# Patient Record
Sex: FEMALE | Race: WHITE
[De-identification: names, ages, dates, MRNs, and addresses within clinical notes are randomized per-mention and may not be internally consistent; named-entity substitution may affect disease eponyms.]

---

## 2018-04-05 ENCOUNTER — HOSPITAL ENCOUNTER (INPATIENT)
Dept: HOSPITAL 11 - JP.SDS | Age: 33
LOS: 2 days | Discharge: HOME | DRG: 403 | End: 2018-04-07
Attending: SURGERY | Admitting: SURGERY
Payer: COMMERCIAL

## 2018-04-05 DIAGNOSIS — K76.0: ICD-10-CM

## 2018-04-05 DIAGNOSIS — G47.33: ICD-10-CM

## 2018-04-05 DIAGNOSIS — K44.9: ICD-10-CM

## 2018-04-05 DIAGNOSIS — E66.01: Primary | ICD-10-CM

## 2018-04-05 DIAGNOSIS — R16.0: ICD-10-CM

## 2018-04-05 DIAGNOSIS — D17.4: ICD-10-CM

## 2018-04-05 DIAGNOSIS — Z99.89: ICD-10-CM

## 2018-04-05 PROCEDURE — 0D164ZA BYPASS STOMACH TO JEJUNUM, PERCUTANEOUS ENDOSCOPIC APPROACH: ICD-10-PCS | Performed by: SURGERY

## 2018-04-05 PROCEDURE — C9113 INJ PANTOPRAZOLE SODIUM, VIA: HCPCS

## 2018-04-05 PROCEDURE — 0WBC4ZX EXCISION OF MEDIASTINUM, PERCUTANEOUS ENDOSCOPIC APPROACH, DIAGNOSTIC: ICD-10-PCS | Performed by: SURGERY

## 2018-04-05 PROCEDURE — 3E0T3BZ INTRODUCTION OF ANESTHETIC AGENT INTO PERIPHERAL NERVES AND PLEXI, PERCUTANEOUS APPROACH: ICD-10-PCS | Performed by: SURGERY

## 2018-04-05 PROCEDURE — 0BQT4ZZ REPAIR DIAPHRAGM, PERCUTANEOUS ENDOSCOPIC APPROACH: ICD-10-PCS | Performed by: SURGERY

## 2018-04-05 PROCEDURE — 0FB24ZX EXCISION OF LEFT LOBE LIVER, PERCUTANEOUS ENDOSCOPIC APPROACH, DIAGNOSTIC: ICD-10-PCS | Performed by: SURGERY

## 2018-04-05 RX ADMIN — ACETAMINOPHEN SCH MG: 650 SOLUTION ORAL at 19:45

## 2018-04-05 RX ADMIN — GABAPENTIN SCH MG: 250 SOLUTION ORAL at 14:02

## 2018-04-05 RX ADMIN — GABAPENTIN SCH MG: 250 SOLUTION ORAL at 21:54

## 2018-04-05 RX ADMIN — ACETAMINOPHEN SCH MG: 650 SOLUTION ORAL at 13:47

## 2018-04-05 RX ADMIN — LIDOCAINE HYDROCHLORIDE ANHYDROUS AND DEXTROSE MONOHYDRATE SCH MLS/HR: .4; 5 INJECTION, SOLUTION INTRAVENOUS at 12:15

## 2018-04-05 RX ADMIN — SODIUM CHLORIDE SCH UNITS: 0.9 INJECTION, SOLUTION INTRAVENOUS at 17:23

## 2018-04-06 RX ADMIN — SODIUM CHLORIDE SCH UNITS: 0.9 INJECTION, SOLUTION INTRAVENOUS at 05:27

## 2018-04-06 RX ADMIN — ACETAMINOPHEN SCH MG: 650 SOLUTION ORAL at 01:27

## 2018-04-06 RX ADMIN — GABAPENTIN SCH MG: 250 SOLUTION ORAL at 09:48

## 2018-04-06 RX ADMIN — ACETAMINOPHEN SCH MG: 650 SOLUTION ORAL at 19:49

## 2018-04-06 RX ADMIN — ACETAMINOPHEN SCH MG: 650 SOLUTION ORAL at 07:43

## 2018-04-06 RX ADMIN — GABAPENTIN SCH MG: 250 SOLUTION ORAL at 13:49

## 2018-04-06 RX ADMIN — ACETAMINOPHEN SCH MG: 650 SOLUTION ORAL at 13:49

## 2018-04-06 RX ADMIN — SODIUM CHLORIDE SCH UNITS: 0.9 INJECTION, SOLUTION INTRAVENOUS at 19:00

## 2018-04-06 RX ADMIN — LIDOCAINE HYDROCHLORIDE ANHYDROUS AND DEXTROSE MONOHYDRATE SCH MLS/HR: .4; 5 INJECTION, SOLUTION INTRAVENOUS at 01:25

## 2018-04-06 RX ADMIN — GABAPENTIN SCH MG: 250 SOLUTION ORAL at 20:43

## 2018-04-06 NOTE — PN
DATE OF SERVICE:  04/06/2018

 

SUBJECTIVE:  Agueda is postop day 1 following a Jose Maria-en-Y gastric bypass surgery.  Her upper

GI this morning was normal.  Vital signs have been stable.  Her pain is controlled and she

has been ambulating.

 

REVIEW OF SYSTEMS:  Remainder of review of systems negative for any pertinent positives and

negatives.

 

OBJECTIVE:  GENERAL:  Agueda Velasquez is a 33-year-old female.  She is alert and orientated,

sitting up in the chair.

VITAL SIGNS:  TPR 97.9, 79, 17, blood pressure 154/90.

HEENT:  Negative.

NECK:  Supple.

HEART:  Regular rate and rhythm.

LUNGS:  Clear.

ABDOMEN:  Dressings dry and intact.  BRYCE drain is intact and has put out 100 mL of a light

pink serosanguineous drainage.

EXTREMITIES:  Without peripheral edema.

 

ASSESSMENT:  Laparoscopic Jose Maria-en-Y gastric bypass surgery, repair of paraesophageal

diaphragmatic hernia, and Tigre-Cut needle liver biopsy.

 

PLAN:

1. Decrease IV to 100 mL per hour.

2. Step-2 gastric bypass diet without cereal.

3. Dressing off, may shower.

4. Communication order 3 med cups, one every 20 minutes, 3 per hour recorded at bedside.

5. Good pulmonary toilet.

6. Zofran ODT 4 mg q.4 hours p.r.n. nausea sublingual.

7. We will evaluate p.r.n. or in a.m.

 

 

 

 

Ana Hurley PA-C

DD:  04/06/2018 17:43:58

DT:  04/06/2018 20:42:31

Job #:  [id number]

## 2018-04-07 RX ADMIN — ACETAMINOPHEN SCH MG: 650 SOLUTION ORAL at 08:00

## 2018-04-07 RX ADMIN — ACETAMINOPHEN SCH MG: 650 SOLUTION ORAL at 01:58

## 2018-04-07 RX ADMIN — SODIUM CHLORIDE SCH UNITS: 0.9 INJECTION, SOLUTION INTRAVENOUS at 05:10

## 2018-04-07 RX ADMIN — GABAPENTIN SCH MG: 250 SOLUTION ORAL at 08:00

## 2018-04-09 NOTE — DISCH
FINAL DIAGNOSES:

1. Morbid obesity.

2. Hepatomegaly.

3. Paraesophageal diaphragmatic hernia.

4. Mediastinal lipoma.

5. Obstructive sleep apnea.

 

PROCEDURES:  Operative procedures done on 04/05/2018, Jose Maria-en-Y gastric bypass with long

limb gastroenterostomy, Tigre-Cut needle liver biopsy, repair of paraesophageal diaphragmatic

hernia, and excision of mediastinal lipoma.

 

HOSPITAL COURSE:  This is a 33-year-old presenting with a longstanding history of morbid

obesity and increasingly significant comorbidities.  After preoperative evaluation and

discussion, she wished to proceed with a gastric bypass procedure.  This was done on the day

of admission, with the above-mentioned additional procedures.

 

Postoperatively, she has had no major problems.  She will be discharged to home with a

combination of Tylenol and gabapentin for her pain, as Celebrex is being worked on in terms

of possibly getting that covered by her insurance.  She will be taking the gabapentin and,

if available, Celebrex x2 weeks, and otherwise, she will follow up with Ana Hurley PA-C,

at [facility] in [place] on 04/17/2018.

## 2018-04-11 NOTE — OR
DATE OF PROCEDURE:  04/05/2018

 

PREOPERATIVE DIAGNOSIS:  Morbid obesity.

 

POSTOPERATIVE DIAGNOSES:

1. Morbid obesity.

2. Marked hepatomegaly.

3. Paraesophageal diaphragmatic hernia.

4. Mediastinal lipoma.

 

OPERATIVE PROCEDURES:

1. Laparoscopic Jose Maria-en-Y gastric bypass with long limb gastroenterostomy (74601
).

2. Tigre-Cut needle liver biopsy (22044).

3. Repair of paraesophageal diaphragmatic hernia (59361).

4. Excision of mediastinal lipoma (48925).

 

ANESTHESIA:  General.

 

ASSISTANTS:  Ana Hurley PA-C, and DINA Gordon.

 

INDICATIONS FOR PROCEDURE:  This is a 33-year-old female presenting with 
longstanding morbid

obesity with increasingly significant comorbidities.  After preoperative 
evaluation and

discussion, she wished to proceed with a gastric bypass procedure.  Potential 
risks of the

procedure including bleeding, infection, leaks from various GI tract closures, 
problems with

bowel obstruction over time, as well as possibility of cardiopulmonary, septic, 
or

hemorrhagic complications leading to death were discussed, and the patient 
wishes to

proceed.

 

DETAILS OF PROCEDURE:  The patient was taken to the operating room, and after 
general

endotracheal anesthesia was induced, was placed in a lithotomy position.  An 
orogastric tube

was placed and the abdomen was prepped and draped.

 

At 15 cm inferior, 5 cm left of xiphoid process, a transverse incision was made 
and

peritoneal cavity entered under direct vision with an Optiview trocar, inflated 
to 15 mmHg

pressure with CO2.  Laparoscope was then reinserted.  No underlying trocar 
insertion site

injuries were seen.  Following this, bilateral subcostal transverse abdominis 
plane blocks

were placed with direct visualization of the needle in the area of the 
transverse abdominis

plane and injection of standard solution.  Following this, 5 additional trocars 
were placed

across the upper and mid abdomen.  General exploration was undertaken.  The 
patient was

noted to have a marked hepatomegaly with liver volume being roughly 3 to 4 
times normal and

strikingly fatty infiltrated.  No signs of cirrhosis per se.  Tigre-Cut needle 
biopsies were

obtained from the left lobe of the liver.  Minimal bleeding from the biopsy 
sites was

controlled with electrocautery.

 

The omentum was then divided in the midline up to the level of the transverse 
colon.  This

allowed identification of the small bowel to the ligament of Treitz.  The small 
bowel was

then traced out 200 cm distal to that point, where it was divided transversely 
with a FIFI

stapler.  The small bowel was then traced out an additional 150 cm, where the 
side-to-side

enteroenterostomy was accomplished with internal firing of the Endo-FIFI 60 mm 
stapler.  The

common opening was then closed transversely with the same stapler and angles 
anastomosed and

mesenteric defect approximated with some 0 Ethibond stitch, along with fibrin 
sealant.  The

divided end of the Jose Maria limb was then  from the mesentery for a few 
centimeters,

which allowed an antecolic position of the Jose Maria limb up to the level of the 
gastroesophageal

junction without tension.

 

The liver was then retracted anteriorly.  The patient was noted to have a 
moderate-sized

paraesophageal diaphragmatic hernia with prolapse of a portion of the gastric 
fundus,

perigastric fat, and edge of the omentum in a plane anterior to the course of 
the esophagus.

This was reduced.  The peritoneum overlying it was incised and reflected 
downward.  During

the course of the dissection, the mediastinal lipoma was encountered, and this 
was excised

and sent as a separate specimen.  Anterior repair of the diaphragmatic hernia 
was then

accomplished with 0 Ethibond sutures reinforced with PTFE pledgets.

 

Following this, the gastrointestinal balloon catheter was inflated to 15 mL and 
pulled up

snugly against the EG junction.  Gastric wall over the apex of the balloon was 
then marked

with electrocautery and the balloon catheter pulled up into the esophagus.  The 
lesser

omental tissue adjacent to the gastric cardia was then incised, allowing 
dissection behind

the stomach at that level.  Gastric pouch was then initiated with a transverse 
firing of the

FIFI stapler at the level of the cauterized armani on the gastric cardia and then 
completed

with an additional firing of FIFI stapler up to and through the angle of His.  
Upon

completion of the pouch, both staple lines were noted to be intact.

 

The anvil of a 25-mm EEA stapler was attached to [place] sump-type tube.  The 
latter was

brought down through the mouth and taken out through a small opening in the 
gastric pouch,

allowing the anvil likewise to be pulled down to within the gastric pouch.  The 
divided end

of the Jose Maria limb was then opened and main body of the EEA stapler passed 
several centimeters

into the lumen of the small bowel, brought up the anvil, and united with it, 
thus creating

the gastrojejunostomy.  Upon removal of the stapler, double donuts of mucosa 
were noted

within it, and the small bowel was closed off with a vascular staple line.

Gastrojejunostomy was then reinforced with some 3-0 Vicryl seromuscular stitch, 
along with

fibrin sealant.  A leak test was accomplished with injection of 120 mL of air 
in the gastric

pouch, while submerged in the cefoxitin-containing saline solution.  No leaks 
were

identified.  Two George-Marroquin drains were then placed adjacent to 
gastrojejunostomy and

taken out through subcostal trocar sites.  With no further problems noted, 
trocars were

removed and peritoneal cavity deflated.  Incisions were closed with some 4-0 
Vicryl skin

stitch, as was the drain.  The patient was taken to the recovery room in 
satisfactory

condition.  There were no evident complications.

 

Physician assistant, Ana Hurley, played an essential role in assisting in 
this case,

helping to position the patient, retract structures as needed, as well as 
suturing and

cutting sutures when indicated.  Her presence improved patient's safety and 
decreased the

operative time.

 

 

 

 

Jaylon Simpson MD

DD:  04/09/2018 19:24:16

DT:  04/10/2018 11:27:09

Job #:  [id number]

[id number]